# Patient Record
Sex: FEMALE | Race: WHITE | HISPANIC OR LATINO
[De-identification: names, ages, dates, MRNs, and addresses within clinical notes are randomized per-mention and may not be internally consistent; named-entity substitution may affect disease eponyms.]

---

## 2019-08-15 PROBLEM — Z00.00 ENCOUNTER FOR PREVENTIVE HEALTH EXAMINATION: Status: ACTIVE | Noted: 2019-08-15

## 2021-10-12 ENCOUNTER — APPOINTMENT (OUTPATIENT)
Dept: GASTROENTEROLOGY | Facility: CLINIC | Age: 46
End: 2021-10-12
Payer: COMMERCIAL

## 2021-10-12 VITALS
OXYGEN SATURATION: 98 % | DIASTOLIC BLOOD PRESSURE: 80 MMHG | SYSTOLIC BLOOD PRESSURE: 120 MMHG | BODY MASS INDEX: 31.54 KG/M2 | HEIGHT: 58 IN | HEART RATE: 86 BPM | TEMPERATURE: 97.5 F | WEIGHT: 150.25 LBS

## 2021-10-12 DIAGNOSIS — Z78.9 OTHER SPECIFIED HEALTH STATUS: ICD-10-CM

## 2021-10-12 PROCEDURE — 99202 OFFICE O/P NEW SF 15 MIN: CPT

## 2021-10-12 RX ORDER — HYDROCORTISONE 2.5% 25 MG/G
2.5 CREAM TOPICAL TWICE DAILY
Qty: 1 | Refills: 5 | Status: ACTIVE | COMMUNITY
Start: 2021-10-12 | End: 1900-01-01

## 2021-10-12 NOTE — HISTORY OF PRESENT ILLNESS
[de-identified] : 46-year-old female in good health complains of 1 episode of bright red blood per rectum 3 days ago associated with some itching in the anus/rectum.  BM otherwise normal.  No diarrhea or straining.  Has a history of internal hemorrhoids with similar symptoms in the past.  Saw colorectal surgery about a year ago.  A colonoscopy was done which was reportedly normal.  She was prescribed a topical cream which resolved the symptoms.  No colorectal cancer risk factors.

## 2021-10-12 NOTE — PHYSICAL EXAM
[Bowel Sounds] : normal bowel sounds [Abdomen Soft] : soft [Abdomen Tenderness] : non-tender [] : no hepato-splenomegaly [Abdomen Mass (___ Cm)] : no abdominal mass palpated [Normal Sphincter Tone] : normal sphincter tone [No Rectal Mass] : no rectal mass [Internal Hemorrhoid] : internal hemorrhoids [External Hemorrhoid] : no external hemorrhoids [Occult Blood Positive] : stool was negative for occult blood

## 2021-10-12 NOTE — ASSESSMENT
[FreeTextEntry1] : Impression: Local anorectal bleed secondary to internal hemorrhoids.  Colonoscopy -1-year ago.  No colorectal cancer risk factors.\par \par Plan: Proctosol HC twice daily for 7 days then as needed.  Repeat colonoscopy 2030

## 2022-04-26 ENCOUNTER — NON-APPOINTMENT (OUTPATIENT)
Age: 47
End: 2022-04-26

## 2022-04-26 ENCOUNTER — APPOINTMENT (OUTPATIENT)
Dept: GASTROENTEROLOGY | Facility: CLINIC | Age: 47
End: 2022-04-26
Payer: COMMERCIAL

## 2022-04-26 VITALS
HEIGHT: 55 IN | SYSTOLIC BLOOD PRESSURE: 110 MMHG | OXYGEN SATURATION: 99 % | BODY MASS INDEX: 33.79 KG/M2 | HEART RATE: 94 BPM | DIASTOLIC BLOOD PRESSURE: 80 MMHG | WEIGHT: 146 LBS | TEMPERATURE: 97.5 F

## 2022-04-26 DIAGNOSIS — K62.5 HEMORRHAGE OF ANUS AND RECTUM: ICD-10-CM

## 2022-04-26 DIAGNOSIS — K62.89 OTHER SPECIFIED DISEASES OF ANUS AND RECTUM: ICD-10-CM

## 2022-04-26 DIAGNOSIS — K64.8 OTHER HEMORRHOIDS: ICD-10-CM

## 2022-04-26 PROCEDURE — 99213 OFFICE O/P EST LOW 20 MIN: CPT

## 2022-04-26 NOTE — PHYSICAL EXAM
[Normal Sphincter Tone] : normal sphincter tone [No Rectal Mass] : no rectal mass [Internal Hemorrhoid] : internal hemorrhoids [FreeTextEntry1] : External skin tags

## 2022-04-26 NOTE — ASSESSMENT
[FreeTextEntry1] : Impression: Patient with internal hemorrhoids and external hemorrhoids/skin tags causing discomfort not responding to treatment with hydrocortisone cream.\par \par Plan: We will refer to colorectal surgery for evaluation and further management.

## 2022-04-26 NOTE — HISTORY OF PRESENT ILLNESS
[Rectal Pain] : rectal pain [de-identified] : Patient completed course of Proctosol cream after last visit with resolution of symptoms.  For the past 3 months she has pain and discomfort in her anus and describes a bump or "ball" that is persistent and hurts when wiping.  Went back to using the hydrocortisone cream which has not been helping.  Bowel movements are normal.  Occasional small amounts of blood on toilet paper.

## 2022-04-26 NOTE — REVIEW OF SYSTEMS
[Abdominal Pain] : no abdominal pain [Vomiting] : no vomiting [Constipation] : no constipation [Diarrhea] : no diarrhea [Heartburn] : no heartburn [Melena] : no melena

## 2022-05-13 ENCOUNTER — APPOINTMENT (OUTPATIENT)
Dept: COLORECTAL SURGERY | Facility: CLINIC | Age: 47
End: 2022-05-13
Payer: COMMERCIAL

## 2022-05-13 VITALS
DIASTOLIC BLOOD PRESSURE: 70 MMHG | HEART RATE: 66 BPM | TEMPERATURE: 98.9 F | BODY MASS INDEX: 28.86 KG/M2 | SYSTOLIC BLOOD PRESSURE: 120 MMHG | WEIGHT: 147 LBS | RESPIRATION RATE: 12 BRPM | HEIGHT: 60 IN

## 2022-05-13 PROCEDURE — 99203 OFFICE O/P NEW LOW 30 MIN: CPT | Mod: 25

## 2022-05-13 PROCEDURE — 45300 PROCTOSIGMOIDOSCOPY DX: CPT

## 2022-05-14 NOTE — PHYSICAL EXAM
[Normal Breath Sounds] : Normal breath sounds [Normal Heart Sounds] : normal heart sounds [Normal Rate and Rhythm] : normal rate and rhythm [No Edema] : No edema [Alert] : alert [Oriented to Person] : oriented to person [Oriented to Place] : oriented to place [Oriented to Time] : oriented to time [Calm] : calm [de-identified] : obese soft +BS NT/ND lap scars [de-identified] : NC/AT [de-identified] : +ROM [de-identified] : intact

## 2022-05-14 NOTE — HISTORY OF PRESENT ILLNESS
[FreeTextEntry1] : 46o female initially seen by GI in October 2021 with [facility]. Had been seen by colorectal surgeon in 2020. Reportedly underwent colonoscopy w/normal results. Dxd with IH, given Rx cream.\par \par F/U GI visit in April 2022 secondary to pain/blood on toilet tissue. Hydrocortisone cream with minimal improvement. Presents for evaluation.\par \par Daily BM. +daily fluid/fiber.

## 2022-05-14 NOTE — ASSESSMENT
[FreeTextEntry1] : 46-year-old  female who presents with a chief complaint of minor anorectal discomfort and bright red blood per rectum.\par \par She has been treated nonoperatively for hemorrhoids in the past with suppositories. She had a recent colonoscopy which was unremarkable. She complains of painless bright red blood per rectum multiple times weekly which she sees on wiping and dripping in the bowl. She also has some minor discomfort from the skin tag when cleaning herself. She does not hav  pain with bowel movements.\par \par Inspection of the anorectal area reveals a very minor skin tag. Digital exam is negative. Sigmoidoscopy to 10 cm is negative and the stool is brown. Anoscopy reveals internal hemorrhoids.\par \par My impression is that of rectal bleeding likely secondary to internal hemorrhoids. I recommend  a trial of hemorrhoidal banding.

## 2022-06-10 ENCOUNTER — APPOINTMENT (OUTPATIENT)
Dept: COLORECTAL SURGERY | Facility: CLINIC | Age: 47
End: 2022-06-10
Payer: COMMERCIAL

## 2022-06-10 PROCEDURE — 46221 LIGATION OF HEMORRHOID(S): CPT

## 2022-06-10 NOTE — HISTORY OF PRESENT ILLNESS
[FreeTextEntry1] : 46-year-old female presents for her first hemorrhoidal banding.\par \par The patient was placed in the left lateral decubitus position. An anoscope was introduced and the right anterior hemorrhoid was isolated. A band was deployed without difficulty.\par \par I will see her in 3 weeks for second banding

## 2022-07-13 ENCOUNTER — APPOINTMENT (OUTPATIENT)
Dept: COLORECTAL SURGERY | Facility: CLINIC | Age: 47
End: 2022-07-13

## 2022-07-13 PROCEDURE — 46221 LIGATION OF HEMORRHOID(S): CPT

## 2022-08-03 ENCOUNTER — APPOINTMENT (OUTPATIENT)
Dept: COLORECTAL SURGERY | Facility: CLINIC | Age: 47
End: 2022-08-03

## 2022-08-03 PROCEDURE — 46221 LIGATION OF HEMORRHOID(S): CPT

## 2022-08-03 NOTE — PHYSICAL EXAM
[FreeTextEntry1] : Gen: Age appropriate female in NAD\par Anorectal: Pt was examined in the left lateral decubitus position. External visual exam without external hemorrhoids, fissures or masses. Digital rectal exam WNL. Anoscopy revealing a moderately engorged left lateral hemorrhoidal column with valsalva. The column was isolated and a band deployed. The pt tolerated the procedure well.

## 2022-08-03 NOTE — HISTORY OF PRESENT ILLNESS
[FreeTextEntry1] : Pleasant 47-year-old  female who presents for her third hemorrhoidal banding.  The patient states that her symptoms are improved after the first 2 bandings.\par \par Patient was examined in the left lateral decubitus position.  An anoscope was introduced and the right posterior hemorrhoidal region was isolated.  A band was deployed without difficulty.\par \par I will see her as needed.\par \par

## 2022-08-03 NOTE — ASSESSMENT
[FreeTextEntry1] : 47F with symptomatic bleeding internal hemorrhoids s/p RBLx2 (LL/RA) with overall improvement.

## 2022-08-03 NOTE — HISTORY OF PRESENT ILLNESS
[FreeTextEntry1] : 47F with hx of symptomatic bleeding internal hemorrhoids now s/p RBL (RA) x2, overall with improvement of her symptomatology. She now presents for her second banding.